# Patient Record
Sex: FEMALE | Race: WHITE | NOT HISPANIC OR LATINO | URBAN - METROPOLITAN AREA
[De-identification: names, ages, dates, MRNs, and addresses within clinical notes are randomized per-mention and may not be internally consistent; named-entity substitution may affect disease eponyms.]

---

## 2019-03-07 PROBLEM — J32.9 CHRONIC SINUSITIS: Status: ACTIVE | Noted: 2019-03-07

## 2019-03-07 PROBLEM — R51.9 SINUS HEADACHE: Status: ACTIVE | Noted: 2019-03-07

## 2019-04-04 PROBLEM — R09.82 POST-NASAL DRIP: Status: ACTIVE | Noted: 2019-04-04

## 2021-02-13 DIAGNOSIS — Z23 ENCOUNTER FOR IMMUNIZATION: ICD-10-CM

## 2022-12-22 PROBLEM — R42 VERTIGO: Status: ACTIVE | Noted: 2022-12-22

## 2022-12-22 PROBLEM — J45.991 COUGH VARIANT ASTHMA: Status: ACTIVE | Noted: 2022-12-22

## 2022-12-22 PROBLEM — H81.10 BENIGN PAROXYSMAL POSITIONAL VERTIGO: Status: ACTIVE | Noted: 2022-12-22

## 2023-06-20 ENCOUNTER — TELEPHONE (OUTPATIENT)
Dept: OBGYN CLINIC | Facility: HOSPITAL | Age: 78
End: 2023-06-20

## 2023-07-26 ENCOUNTER — APPOINTMENT (OUTPATIENT)
Dept: RADIOLOGY | Facility: AMBULARY SURGERY CENTER | Age: 78
End: 2023-07-26
Attending: ORTHOPAEDIC SURGERY
Payer: MEDICARE

## 2023-07-26 ENCOUNTER — OFFICE VISIT (OUTPATIENT)
Dept: OBGYN CLINIC | Facility: CLINIC | Age: 78
End: 2023-07-26
Payer: MEDICARE

## 2023-07-26 DIAGNOSIS — S32.592A CLOSED FRACTURE OF MULTIPLE PUBIC RAMI, LEFT, INITIAL ENCOUNTER (HCC): Primary | ICD-10-CM

## 2023-07-26 DIAGNOSIS — R10.2 PELVIC PAIN: ICD-10-CM

## 2023-07-26 PROCEDURE — 72170 X-RAY EXAM OF PELVIS: CPT

## 2023-07-26 PROCEDURE — 99204 OFFICE O/P NEW MOD 45 MIN: CPT | Performed by: ORTHOPAEDIC SURGERY

## 2023-07-26 RX ORDER — TRAMADOL HYDROCHLORIDE 50 MG/1
50 TABLET ORAL 2 TIMES DAILY PRN
COMMUNITY
Start: 2023-06-14

## 2023-07-26 NOTE — PROGRESS NOTES
Assessment:  1. Closed fracture of multiple pubic rami, left, initial encounter ([address])  XR pelvis ap only 1 or 2 vw        Patient Active Problem List   Diagnosis   • Chronic sinusitis   • Sinus headache   • Post-nasal drip   • Vertigo   • Cough variant asthma           Plan      70 y.o. female with left pubic rami fracture  · X-ray's obtained today, demonstrate interval healing  · Continue use of cane as needed  · Activities as tolerated  · Follow up in 6 weeks, repeat x-ray at that time             Subjective:     Patient ID:    Chief Complaint:Soraya Feliciano [facility] 70 y.o. female      HPI    Patient presents to the office for initial evaluation of pelvic fracture. Patient had repeat falls approximately 6 weeks ago. 6/7/2023 and 6/14/2023, she [place] at the time so seen at an Urgent Care out of network. Patient states she walked with a walker for 3 weeks, she is now using a cane. She notes significant improvements in her pain since time of injury.          The following portions of the patient's history were reviewed and updated as appropriate: allergies, current medications, past family history, past social history, past surgical history and problem list.        Social History     Socioeconomic History   • Marital status: /Civil Union     Spouse name: Anastasiya Lewis    • Number of children: 2   • Years of education: Not on file   • Highest education level: Not on file   Occupational History   • Occupation: Self storage      Comment: full-time    • Occupation: tent/party rental      Comment: full-time    Tobacco Use   • Smoking status: Never   • Smokeless tobacco: Never   Substance and Sexual Activity   • Alcohol use: Yes     Comment: social   • Drug use: Never   • Sexual activity: Not on file   Other Topics Concern   • Not on file   Social History Narrative    Patient lives in a home that was built [address] pump X2 gas/electric     [address] aquilino in the bedroom     [facility] No air  or purifiers     Dehumidifier in the basement     No humidifier     Home is smoke free     [place]     Partially finished basement         Cat (Lizzeth Phelps) and he is allowed in the bedroom         Caffeine: 3 cups of coffee daily                     Ice tea in the summer months                     Hot tea 1x a month     Chocolate consumed 3-4 times a week         Patient lives with spouse      Social Determinants of Health     Financial Resource Strain: Not on file   Food Insecurity: Not on file   Transportation Needs: Not on file   Physical Activity: Sufficiently Active (11/25/2019)    Exercise Vital Sign    • Days of Exercise per Week: 5 days    • Minutes of Exercise per Session: 30 min   Stress: Not on file   Social Connections: Not on file   Intimate Partner Violence: Not on file   Housing Stability: Not on file     Past Medical History:   Diagnosis Date   • Eustachian tube dysfunction    • Hypercholesteremia    • Sinusitis, chronic    • Wears glasses      Past Surgical History:   Procedure Laterality Date   • APPENDECTOMY     • COLONOSCOPY     • EYE SURGERY Left     correct cross eye    • OVARIAN CYST SURGERY     • TONSILECTOMY AND ADNOIDECTOMY     • WISDOM TOOTH EXTRACTION      x2      Allergies   Allergen Reactions   • Codeine Other (See Comments)     Patient states "It takes away my brain use and I get space-like, I don't like it"     Current Outpatient Medications on File Prior to Visit   Medication Sig Dispense Refill   • Multiple Vitamins-Minerals (PRESERVISION AREDS 2+MULTI VIT PO)      • albuterol (Ventolin HFA) 90 mcg/act inhaler Inhale 2 puffs every 6 (six) hours as needed for wheezing 18 g 5   • atorvastatin (LIPITOR) 10 mg tablet Take 10 mg by mouth every evening  1   • Cholecalciferol (D3-1000) 1000 units capsule Take 1,000 Units by mouth     • fexofenadine (ALLEGRA) 180 MG tablet Once Daily in morning     • fluticasone (FLONASE) 50 mcg/act nasal spray 1 spray into each nostril daily for 365 doses 1 Bottle 12   • fluticasone-salmeterol (ADVAIR DISKUS) 250-50 mcg/dose inhaler TAKE 1 PUFF BY MOUTH TWICE A DAY     • glucosamine 500 MG CAPS capsule Once Daily     • Lecithin 1000 MG CHEW Chew     • LUTEIN PO sig 1 po twice daily     • MAGNESIUM PO Take by mouth     • traMADol (ULTRAM) 50 mg tablet Take 50 mg by mouth 2 (two) times a day as needed       No current facility-administered medications on file prior to visit. Objective:    Review of Systems   Constitutional: Negative for chills and fever. HENT: Negative for ear pain and sore throat. Eyes: Negative for pain and visual disturbance. Respiratory: Negative for cough and shortness of breath. Cardiovascular: Negative for chest pain and palpitations. Gastrointestinal: Negative for abdominal pain and vomiting. Genitourinary: Negative for dysuria and hematuria. Musculoskeletal: Negative for arthralgias and back pain. Skin: Negative for color change and rash. Neurological: Negative for seizures and syncope. All other systems reviewed and are negative. Left Hip Exam     Muscle Strength   Abduction: 5/5   Adduction: 5/5   Flexion: 5/5     Other   Erythema: absent  Sensation: normal  Pulse: present    Comments:  Pain with abduction and hip flexion             Physical Exam  Vitals and nursing note reviewed. HENT:      Head: Normocephalic. Eyes:      Extraocular Movements: Extraocular movements intact. Cardiovascular:      Rate and Rhythm: Normal rate. Pulses: Normal pulses. Pulmonary:      Effort: Pulmonary effort is normal.   Musculoskeletal:         General: Normal range of motion. Cervical back: Normal range of motion. Skin:     General: Skin is warm and dry. Neurological:      General: No focal deficit present. Mental Status: She is alert.    Psychiatric:         Behavior: Behavior normal.          Procedures  No Procedures performed today    I have personally reviewed pertinent films in PACS. AP pelvis x-ray obtained today demonstrates superior and inferior left pelvic rami fractures with interval healing      Scribe Attestation    I,:  David Charles am acting as a scribe while in the presence of the attending physician.:       I,:  Jin Zarate, DO personally performed the services described in this documentation    as scribed in my presence.:           Portions of the record may have been created with voice recognition software.  Occasional wrong word or "sound a like" substitutions may have occurred due to the inherent limitations of voice recognition software.  Read the chart carefully and recognize, using context, where substitutions have occurred. same name as above

## 2023-09-07 ENCOUNTER — APPOINTMENT (OUTPATIENT)
Dept: RADIOLOGY | Facility: AMBULARY SURGERY CENTER | Age: 78
End: 2023-09-07
Attending: ORTHOPAEDIC SURGERY
Payer: MEDICARE

## 2023-09-07 ENCOUNTER — OFFICE VISIT (OUTPATIENT)
Dept: OBGYN CLINIC | Facility: CLINIC | Age: 78
End: 2023-09-07
Payer: MEDICARE

## 2023-09-07 VITALS — BODY MASS INDEX: 32.25 KG/M2 | HEIGHT: 59 IN | WEIGHT: 160 LBS

## 2023-09-07 DIAGNOSIS — S32.592A CLOSED FRACTURE OF MULTIPLE PUBIC RAMI, LEFT, INITIAL ENCOUNTER (HCC): ICD-10-CM

## 2023-09-07 DIAGNOSIS — S32.592D CLOSED FRACTURE OF MULTIPLE RAMI OF LEFT PUBIS WITH ROUTINE HEALING, SUBSEQUENT ENCOUNTER: Primary | ICD-10-CM

## 2023-09-07 PROCEDURE — 99212 OFFICE O/P EST SF 10 MIN: CPT | Performed by: ORTHOPAEDIC SURGERY

## 2023-09-07 PROCEDURE — 72170 X-RAY EXAM OF PELVIS: CPT

## 2023-09-07 NOTE — PROGRESS NOTES
Assessment:  1. Closed fracture of multiple rami of left pubis with routine healing, subsequent encounter  XR pelvis ap only 1 or 2 vw        Patient Active Problem List   Diagnosis   • Chronic sinusitis   • Sinus headache   • Post-nasal drip   • Vertigo   • Cough variant asthma           Plan      70 y.o. female with pubic rami fracture, DOI 6/7/2023  · Repeat x-ray's obtained today demonstrate interval healing  · Patient completed DXA scan since last visit and follow up with endocrinology for continued osteoporosis management   · Activities as tolerated  · Follow up in 9 months at which point she will be 1 year s/p fall            Subjective:     Patient ID:    Chief Complaint:Soraya Cazares 70 y.o. female      HPI    Patient presents for follow up evaluation of pelvic fracture. Patient experienced 2 falls 6/7/2023 and 6/14/2023 resulting in left pubic rami fracture. Patient states she is doing well and pain is controlled. She is no longer using a cane for ambulation assistance.      The following portions of the patient's history were reviewed and updated as appropriate: allergies, current medications, past family history, past social history, past surgical history and problem list.        Social History     Socioeconomic History   • Marital status: /Civil Union     Spouse name: Teresa Huang    • Number of children: 2   • Years of education: Not on file   • Highest education level: Not on file   Occupational History   • Occupation: Self storage      Comment: full-time    • Occupation: tent/party rental      Comment: full-time    Tobacco Use   • Smoking status: Never   • Smokeless tobacco: Never   Substance and Sexual Activity   • Alcohol use: Yes     Comment: social   • Drug use: Never   • Sexual activity: Not on file   Other Topics Concern   • Not on file   Social History Narrative    Patient lives in a home that was built [address] X2 gas/electric     [address] aquilino in the bedroom Central air     No air  or purifiers     Dehumidifier in the basement     No humidifier     Home is smoke free     [place]     Partially finished basement         Cat (Nikole Manjarrez) and he is allowed in the bedroom         Caffeine: 3 cups of coffee daily                     Ice tea in the summer months                     Hot tea 1x a month     Chocolate consumed 3-4 times a week         Patient lives with spouse      Social Determinants of Health     Financial Resource Strain: Not on file   Food Insecurity: Not on file   Transportation Needs: Not on file   Physical Activity: Sufficiently Active (11/25/2019)    Exercise Vital Sign    • Days of Exercise per Week: 5 days    • Minutes of Exercise per Session: 30 min   Stress: Not on file   Social Connections: Not on file   Intimate Partner Violence: Not on file   Housing Stability: Not on file     Past Medical History:   Diagnosis Date   • Eustachian tube dysfunction    • Hypercholesteremia    • Sinusitis, chronic    • Wears glasses      Past Surgical History:   Procedure Laterality Date   • APPENDECTOMY     • COLONOSCOPY     • EYE SURGERY Left     correct cross eye    • OVARIAN CYST SURGERY     • TONSILECTOMY AND ADNOIDECTOMY     • WISDOM TOOTH EXTRACTION      x2      Allergies   Allergen Reactions   • Codeine Other (See Comments)     Patient states "It takes away my brain use and I get space-like, I don't like it"     Current Outpatient Medications on File Prior to Visit   Medication Sig Dispense Refill   • albuterol (Ventolin HFA) 90 mcg/act inhaler Inhale 2 puffs every 6 (six) hours as needed for wheezing 18 g 5   • atorvastatin (LIPITOR) 10 mg tablet Take 10 mg by mouth every evening  1   • Cholecalciferol (D3-1000) 1000 units capsule Take 1,000 Units by mouth     • fexofenadine (ALLEGRA) 180 MG tablet Once Daily in morning     • fluticasone (FLONASE) 50 mcg/act nasal spray 1 spray into each nostril daily for 365 doses 1 Bottle 12 • fluticasone-salmeterol (ADVAIR DISKUS) 250-50 mcg/dose inhaler TAKE 1 PUFF BY MOUTH TWICE A DAY     • glucosamine 500 MG CAPS capsule Once Daily     • Lecithin 1000 MG CHEW Chew     • LUTEIN PO sig 1 po twice daily     • MAGNESIUM PO Take by mouth     • Multiple Vitamins-Minerals (PRESERVISION AREDS 2+MULTI VIT PO)      • traMADol (ULTRAM) 50 mg tablet Take 50 mg by mouth 2 (two) times a day as needed       No current facility-administered medications on file prior to visit. Objective:    Review of Systems   Constitutional: Negative for chills and fever. HENT: Negative for ear pain and sore throat. Eyes: Negative for pain and visual disturbance. Respiratory: Negative for cough and shortness of breath. Cardiovascular: Negative for chest pain and palpitations. Gastrointestinal: Negative for abdominal pain and vomiting. Genitourinary: Negative for dysuria and hematuria. Musculoskeletal: Negative for arthralgias and back pain. Skin: Negative for color change and rash. Neurological: Negative for seizures and syncope. All other systems reviewed and are negative. Right Hip Exam     Tenderness   The patient is experiencing no tenderness. Muscle Strength   Abduction: 5/5   Adduction: 5/5   Flexion: 5/5       Left Hip Exam     Tenderness   The patient is experiencing no tenderness. Muscle Strength   Abduction: 5/5   Adduction: 5/5   Flexion: 5/5             Physical Exam  Vitals and nursing note reviewed. HENT:      Head: Normocephalic. Eyes:      Extraocular Movements: Extraocular movements intact. Cardiovascular:      Rate and Rhythm: Normal rate. Pulses: Normal pulses. Pulmonary:      Effort: Pulmonary effort is normal.   Musculoskeletal:         General: Normal range of motion. Cervical back: Normal range of motion. Skin:     General: Skin is warm and dry. Neurological:      General: No focal deficit present. Mental Status: She is alert. Psychiatric:         Behavior: Behavior normal.         Procedures  No Procedures performed today    I have personally reviewed pertinent films in PACS. Scribe Attestation    I,:  Jaren Mckeon am acting as a scribe while in the presence of the attending physician.:       I,:  Dora Michelle, DO personally performed the services described in this documentation    as scribed in my presence.:           Portions of the record may have been created with voice recognition software.  Occasional wrong word or "sound a like" substitutions may have occurred due to the inherent limitations of voice recognition software.  Read the chart carefully and recognize, using context, where substitutions have occurred.

## 2024-06-06 ENCOUNTER — APPOINTMENT (OUTPATIENT)
Dept: RADIOLOGY | Facility: AMBULARY SURGERY CENTER | Age: 79
End: 2024-06-06
Attending: ORTHOPAEDIC SURGERY
Payer: MEDICARE

## 2024-06-06 ENCOUNTER — OFFICE VISIT (OUTPATIENT)
Dept: OBGYN CLINIC | Facility: CLINIC | Age: 79
End: 2024-06-06
Payer: MEDICARE

## 2024-06-06 VITALS — HEIGHT: 59 IN | WEIGHT: 160 LBS | BODY MASS INDEX: 32.25 KG/M2

## 2024-06-06 DIAGNOSIS — S32.592D CLOSED FRACTURE OF MULTIPLE RAMI OF LEFT PUBIS WITH ROUTINE HEALING, SUBSEQUENT ENCOUNTER: Primary | ICD-10-CM

## 2024-06-06 DIAGNOSIS — S32.592D CLOSED FRACTURE OF MULTIPLE RAMI OF LEFT PUBIS WITH ROUTINE HEALING, SUBSEQUENT ENCOUNTER: ICD-10-CM

## 2024-06-06 PROCEDURE — 72170 X-RAY EXAM OF PELVIS: CPT

## 2024-06-06 PROCEDURE — 99213 OFFICE O/P EST LOW 20 MIN: CPT | Performed by: ORTHOPAEDIC SURGERY

## 2024-06-06 RX ORDER — DENOSUMAB 60 MG/ML
INJECTION SUBCUTANEOUS
COMMUNITY
Start: 2023-12-05

## 2024-06-06 NOTE — PROGRESS NOTES
Assessment:  1. Closed fracture of multiple rami of left pubis with routine healing, subsequent encounter  XR pelvis ap only 1 or 2 vw        Patient Active Problem List   Diagnosis    Chronic sinusitis    Sinus headache    Post-nasal drip    Vertigo    Cough variant asthma       Plan:    79 y.o. female  with left superior and inferior pubic rami fractures, DOI 06/06/2023     Discussed x-ray findings of the AP pelvis  Discussed x-ray shows healing of  left superior and inferior pubic rami fractures.  Patient to continue following with endocrinology for management of osteoporosis.  Patient notes she is currently managed on medications for management of osteoporosis.  Patient to follow-up as needed.     The assessment and plan were formulated by Dr. Lechuga and I assisted in carrying it out.    Subjective:   Patient ID: Soraya Cazares is a 79 y.o. female .    HPI    Patient presents to the office for follow up of left pubic rami fracture date of injury 06/06/2023. Patient states she is doing well.  Patient notes some pain in the back and lateral thigh after standing for long periods of time.  Overall doing well.  States she is currently managed on medications for osteoporosis.    The following portions of the patient's history were reviewed and updated as appropriate: allergies, current medications, past family history, past social history, past surgical history and problem list.    Social History     Socioeconomic History    Marital status: /Civil Union     Spouse name: Gunnar     Number of children: 2    Years of education: Not on file    Highest education level: Not on file   Occupational History    Occupation: Self storage      Comment: full-time     Occupation: tent/party rental      Comment: full-time    Tobacco Use    Smoking status: Never    Smokeless tobacco: Never   Substance and Sexual Activity    Alcohol use: Yes     Comment: social    Drug use: Never    Sexual activity: Not on file   Other  Topics Concern    Not on file   Social History Narrative    Patient lives in a home that was built 1964-rebuilt 1996     Heat pump X2 gas/electric     [place]arpet aquilino in the bedroom     Central air     No air  or purifiers     Dehumidifier in the basement     No humidifier     Home is smoke free     [place]     Partially finished basement         Cat (Glo) and he is allowed in the bedroom         Caffeine: 3 cups of coffee daily                     Ice tea in the summer months                     Hot tea 1x a month     Chocolate consumed 3-4 times a week         Patient lives with spouse      Social Determinants of Health     Financial Resource Strain: Low Risk  (5/1/2023)    Received from [facility]    Overall Financial Resource Strain (CARDIA)     Difficulty of Paying Living Expenses: Not hard at all   Food Insecurity: No Food Insecurity (5/1/2023)    Received from [facility]    Hunger Vital Sign     Worried About Running Out of Food in the Last Year: Never true     Ran Out of Food in the Last Year: Never true   Transportation Needs: No Transportation Needs (5/1/2023)    Received from [facility]    PRAPARE - Transportation     Lack of Transportation (Medical): No     Lack of Transportation (Non-Medical): No   Physical Activity: Sufficiently Active (11/25/2019)    Exercise Vital Sign     Days of Exercise per Week: 5 days     Minutes of Exercise per Session: 30 min   Stress: No Stress Concern Present (5/1/2023)    Received from [facility]    [place] [employer] - Occupational Stress Questionnaire     Feeling of Stress : Only a little   Social Connections: Socially Integrated (5/1/2023)    Received from [facility]    Social Connection and Isolation Panel [NHANES]     Frequency of Communication with Friends and Family: More than three times a week     Frequency of Social  "Gatherings with Friends and Family: Three times a week     Attends [employer] Services: 1 to 4 times per year     Active Member of Clubs or Organizations: Yes     Attends Club or Organization Meetings: 1 to 4 times per year     Marital Status:    Intimate Partner Violence: Not At Risk (5/1/2023)    Received from [facility]    Humiliation, Afraid, Rape, and Kick questionnaire     Fear of Current or Ex-Partner: No     Emotionally Abused: No     Physically Abused: No     Sexually Abused: No   Housing Stability: Not on file     Past Medical History:   Diagnosis Date    Eustachian tube dysfunction     Hypercholesteremia     Sinusitis, chronic     Wears glasses      Past Surgical History:   Procedure Laterality Date    APPENDECTOMY      COLONOSCOPY      EYE SURGERY Left     correct cross eye     OVARIAN CYST SURGERY      TONSILECTOMY AND ADNOIDECTOMY      WISDOM TOOTH EXTRACTION      x2      Allergies   Allergen Reactions    Codeine Other (See Comments)     Patient states \"It takes away my brain use and I get space-like, I don't like it\"     Current Outpatient Medications on File Prior to Visit   Medication Sig Dispense Refill    albuterol (Ventolin HFA) 90 mcg/act inhaler Inhale 2 puffs every 6 (six) hours as needed for wheezing 18 g 5    atorvastatin (LIPITOR) 10 mg tablet Take 10 mg by mouth every evening  1    denosumab (Prolia) 60 mg/mL       fexofenadine (ALLEGRA) 180 MG tablet Once Daily in morning      fluticasone (FLONASE) 50 mcg/act nasal spray 1 spray into each nostril daily for 365 doses 1 Bottle 12    glucosamine 500 MG CAPS capsule Once Daily      Lecithin 1000 MG CHEW Chew      MAGNESIUM PO Take by mouth      Multiple Vitamins-Minerals (PRESERVISION AREDS 2+MULTI VIT PO)       [DISCONTINUED] Cholecalciferol (D3-1000) 1000 units capsule Take 1,000 Units by mouth      [DISCONTINUED] fluticasone-salmeterol (ADVAIR DISKUS) 250-50 mcg/dose inhaler TAKE 1 PUFF BY MOUTH TWICE A DAY      " [DISCONTINUED] LUTEIN PO sig 1 po twice daily      [DISCONTINUED] traMADol (ULTRAM) 50 mg tablet Take 50 mg by mouth 2 (two) times a day as needed       No current facility-administered medications on file prior to visit.       Review of Systems   Constitutional:  Negative for chills and fever.   HENT:  Negative for ear pain and sore throat.    Eyes:  Negative for pain and visual disturbance.   Respiratory:  Negative for cough and shortness of breath.    Cardiovascular:  Negative for chest pain and palpitations.   Gastrointestinal:  Negative for abdominal pain and vomiting.   Genitourinary:  Negative for dysuria and hematuria.   Musculoskeletal:  Negative for arthralgias and back pain.   Skin:  Negative for color change and rash.   Neurological:  Negative for seizures and syncope.   All other systems reviewed and are negative.    See HPi    Objective:    There were no vitals filed for this visit.    Physical Exam  Vitals and nursing note reviewed.   Constitutional:       General: She is not in acute distress.     Appearance: She is well-developed.   HENT:      Head: Normocephalic and atraumatic.   Eyes:      Conjunctiva/sclera: Conjunctivae normal.   Cardiovascular:      Rate and Rhythm: Normal rate and regular rhythm.      Heart sounds: No murmur heard.  Pulmonary:      Effort: Pulmonary effort is normal. No respiratory distress.      Breath sounds: Normal breath sounds.   Abdominal:      Palpations: Abdomen is soft.      Tenderness: There is no abdominal tenderness.   Musculoskeletal:         General: No swelling.      Cervical back: Neck supple.   Skin:     General: Skin is warm and dry.      Capillary Refill: Capillary refill takes less than 2 seconds.   Neurological:      Mental Status: She is alert.   Psychiatric:         Mood and Affect: Mood normal.           I have personally reviewed pertinent films in PACS and my interpretation is x-ray AP pelvis reveals healing fractures of superior and inferior pubic  "rami fractures.    Procedures  No Procedures performed today     Portions of the record may have been created with voice recognition software.  Occasional wrong word or \"sound a like\" substitutions may have occurred due to the inherent limitations of voice recognition software.  Read the chart carefully and recognize, using context, where substitutions have occurred.   "

## 2024-06-26 ENCOUNTER — APPOINTMENT (OUTPATIENT)
Dept: RADIOLOGY | Facility: AMBULARY SURGERY CENTER | Age: 79
End: 2024-06-26
Attending: ORTHOPAEDIC SURGERY
Payer: MEDICARE

## 2024-06-26 ENCOUNTER — OFFICE VISIT (OUTPATIENT)
Dept: OBGYN CLINIC | Facility: CLINIC | Age: 79
End: 2024-06-26
Payer: MEDICARE

## 2024-06-26 VITALS
WEIGHT: 160 LBS | DIASTOLIC BLOOD PRESSURE: 80 MMHG | BODY MASS INDEX: 32.25 KG/M2 | HEIGHT: 59 IN | HEART RATE: 101 BPM | SYSTOLIC BLOOD PRESSURE: 149 MMHG

## 2024-06-26 DIAGNOSIS — S83.241A TEAR OF MEDIAL MENISCUS OF RIGHT KNEE, CURRENT, UNSPECIFIED TEAR TYPE, INITIAL ENCOUNTER: Primary | ICD-10-CM

## 2024-06-26 DIAGNOSIS — M25.561 RIGHT KNEE PAIN, UNSPECIFIED CHRONICITY: ICD-10-CM

## 2024-06-26 DIAGNOSIS — S83.411A SPRAIN OF MEDIAL COLLATERAL LIGAMENT OF RIGHT KNEE, INITIAL ENCOUNTER: ICD-10-CM

## 2024-06-26 PROCEDURE — 20610 DRAIN/INJ JOINT/BURSA W/O US: CPT | Performed by: ORTHOPAEDIC SURGERY

## 2024-06-26 PROCEDURE — 73562 X-RAY EXAM OF KNEE 3: CPT

## 2024-06-26 PROCEDURE — 99214 OFFICE O/P EST MOD 30 MIN: CPT | Performed by: ORTHOPAEDIC SURGERY

## 2024-06-26 RX ORDER — BUPIVACAINE HYDROCHLORIDE 2.5 MG/ML
2 INJECTION, SOLUTION EPIDURAL; INFILTRATION; INTRACAUDAL
Status: COMPLETED | OUTPATIENT
Start: 2024-06-26 | End: 2024-06-26

## 2024-06-26 RX ORDER — ROMOSOZUMAB-AQQG 105 MG/1.17ML
INJECTION, SOLUTION SUBCUTANEOUS
COMMUNITY
Start: 2024-06-18

## 2024-06-26 RX ORDER — TRIAMCINOLONE ACETONIDE 40 MG/ML
40 INJECTION, SUSPENSION INTRA-ARTICULAR; INTRAMUSCULAR
Status: COMPLETED | OUTPATIENT
Start: 2024-06-26 | End: 2024-06-26

## 2024-06-26 RX ADMIN — BUPIVACAINE HYDROCHLORIDE 2 ML: 2.5 INJECTION, SOLUTION EPIDURAL; INFILTRATION; INTRACAUDAL at 13:15

## 2024-06-26 RX ADMIN — TRIAMCINOLONE ACETONIDE 40 MG: 40 INJECTION, SUSPENSION INTRA-ARTICULAR; INTRAMUSCULAR at 13:15

## 2024-06-26 NOTE — PROGRESS NOTES
Assessment:  1. Tear of medial meniscus of right knee, current, unspecified tear type, initial encounter  MRI knee right wo contrast      2. Sprain of medial collateral ligament of right knee, initial encounter  XR knee 3 vw right non injury        Patient Active Problem List   Diagnosis    Chronic sinusitis    Sinus headache    Post-nasal drip    Vertigo    Cough variant asthma    Sprain of medial collateral ligament of right knee    Tear of medial meniscus of right knee, current       Plan:    79 y.o. female with medial meniscus tear and MCL sprain of the right knee     Xray of the right knee reviewed with patient   Underlying pathology of diagnosis reviewed with patient   Discussed CSI of the right knee. Discussed side effects and risks. Patient agreeable. Injection administered.   Discussed MRI of the right knee to rule out medial meniscus tear of the right knee. Discussed if no improvement with CSI, should complete MRI.   Follow up after MRI     The patient was seen and examined by Dr. Lechuga and myself. The assessment and plan were formulated by Dr. Lechuga and I assisted in carrying it out.    Subjective:   Patient ID: Soraya Cazares is a 79 y.o. female .    HPI    Patient comes in today with regards to right knee pain.  Patient is referred to us by Danika Elder MD for further evaluation. Patient notes right knee pain x 1 week. Patient states she was doing a puzzle where she was reaching over the table, when she went to leave she suddenly felt sharp pain to the medial and anterior aspect of the right knee. Notes use of advil and tylenol as well as compression bracing with moderate relief of pain. Notes she is currently ambulating with pain secondary to injury. Denies previous sx or injury. Denies previous CSI.       The following portions of the patient's history were reviewed and updated as appropriate: allergies, current medications, past family history, past social history, past surgical history and  problem list.    Social History     Socioeconomic History    Marital status: /Civil Union     Spouse name: Gunnar     Number of children: 2    Years of education: Not on file    Highest education level: Not on file   Occupational History    Occupation: Self storage      Comment: full-time     Occupation: tent/party rental      Comment: full-time    Tobacco Use    Smoking status: Never    Smokeless tobacco: Never   Substance and Sexual Activity    Alcohol use: Yes     Comment: social    Drug use: Never    Sexual activity: Not on file   Other Topics Concern    Not on file   Social History Narrative    Patient lives in a home that was built 1964-rebuilt 1996     Heat pump X2 gas/electric     Carpet aquilino in the bedroom     Central air     No air  or purifiers     Dehumidifier in the basement     No humidifier     Home is smoke free     [place]     Partially finished basement         Cat (Glo) and he is allowed in the bedroom         Caffeine: 3 cups of coffee daily                     Ice tea in the summer months                     Hot tea 1x a month     Chocolate consumed 3-4 times a week         Patient lives with spouse      Social Determinants of Health     Financial Resource Strain: Low Risk  (5/1/2023)    Received from [facility]    Overall Financial Resource Strain (CARDIA)     Difficulty of Paying Living Expenses: Not hard at all   Food Insecurity: No Food Insecurity (5/1/2023)    Received from [facility]    Hunger Vital Sign     Worried About Running Out of Food in the Last Year: Never true     Ran Out of Food in the Last Year: Never true   Transportation Needs: No Transportation Needs (5/1/2023)    Received from [facility]    PRAPARE - Transportation     Lack of Transportation (Medical): No     Lack of Transportation (Non-Medical): No   Physical Activity: Sufficiently Active (11/25/2019)    Exercise Vital  "Sign     Days of Exercise per Week: 5 days     Minutes of Exercise per Session: 30 min   Stress: No Stress Concern Present (5/1/2023)    Received from [facility]    [place] [employer] - Occupational Stress Questionnaire     Feeling of Stress : Only a little   Social Connections: Socially Integrated (5/1/2023)    Received from [facility]    Social Connection and Isolation Panel [NHANES]     Frequency of Communication with Friends and Family: More than three times a week     Frequency of Social Gatherings with Friends and Family: Three times a week     Attends Orthodoxy Services: 1 to 4 times per year     Active Member of Clubs or Organizations: Yes     Attends Club or Organization Meetings: 1 to 4 times per year     Marital Status:    Intimate Partner Violence: Not At Risk (5/1/2023)    Received from [facility]    Humiliation, Afraid, Rape, and Kick questionnaire     Fear of Current or Ex-Partner: No     Emotionally Abused: No     Physically Abused: No     Sexually Abused: No   Housing Stability: Not on file     Past Medical History:   Diagnosis Date    Eustachian tube dysfunction     Hypercholesteremia     Sinusitis, chronic     Wears glasses      Past Surgical History:   Procedure Laterality Date    APPENDECTOMY      COLONOSCOPY      EYE SURGERY Left     correct cross eye     OVARIAN CYST SURGERY      TONSILECTOMY AND ADNOIDECTOMY      WISDOM TOOTH EXTRACTION      x2      Allergies   Allergen Reactions    Codeine Other (See Comments)     Patient states \"It takes away my brain use and I get space-like, I don't like it\"     Current Outpatient Medications on File Prior to Visit   Medication Sig Dispense Refill    romosozumab-aqqg (Evenity) subcutaneous injection       albuterol (Ventolin HFA) 90 mcg/act inhaler Inhale 2 puffs every 6 (six) hours as needed for wheezing 18 g 5    atorvastatin (LIPITOR) 10 mg tablet Take 10 mg by mouth " every evening  1    denosumab (Prolia) 60 mg/mL       fexofenadine (ALLEGRA) 180 MG tablet Once Daily in morning      fluticasone (FLONASE) 50 mcg/act nasal spray 1 spray into each nostril daily for 365 doses 1 Bottle 12    glucosamine 500 MG CAPS capsule Once Daily      Lecithin 1000 MG CHEW Chew      MAGNESIUM PO Take by mouth      Multiple Vitamins-Minerals (PRESERVISION AREDS 2+MULTI VIT PO)        No current facility-administered medications on file prior to visit.       Review of Systems   Constitutional:  Negative for chills and fever.   HENT:  Negative for ear pain and sore throat.    Eyes:  Negative for pain and visual disturbance.   Respiratory:  Negative for cough and shortness of breath.    Cardiovascular:  Negative for chest pain and palpitations.   Gastrointestinal:  Negative for abdominal pain and vomiting.   Genitourinary:  Negative for dysuria and hematuria.   Musculoskeletal:  Positive for arthralgias. Negative for back pain.   Skin:  Negative for color change and rash.   Neurological:  Negative for seizures and syncope.   All other systems reviewed and are negative.        Objective:    Vitals:    06/26/24 1320   BP: 149/80   Pulse: 101       Physical Exam  Vitals and nursing note reviewed.   Constitutional:       General: She is not in acute distress.     Appearance: She is well-developed.   HENT:      Head: Normocephalic and atraumatic.   Eyes:      Conjunctiva/sclera: Conjunctivae normal.   Cardiovascular:      Rate and Rhythm: Normal rate and regular rhythm.      Heart sounds: No murmur heard.  Pulmonary:      Effort: Pulmonary effort is normal. No respiratory distress.      Breath sounds: Normal breath sounds.   Abdominal:      Palpations: Abdomen is soft.      Tenderness: There is no abdominal tenderness.   Musculoskeletal:         General: No swelling.      Cervical back: Neck supple.      Right knee:      Instability Tests: Medial Lydia test positive.   Skin:     General: Skin is warm  "and dry.      Capillary Refill: Capillary refill takes less than 2 seconds.   Neurological:      Mental Status: She is alert.   Psychiatric:         Mood and Affect: Mood normal.         Right Knee Exam     Tenderness   The patient is experiencing tenderness in the pes anserinus.    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    Large joint arthrocentesis: R knee  Universal Protocol:  Consent given by: patient  Supporting Documentation  Indications: pain   Procedure Details  Location: knee - R knee  Needle size: 22 G  Ultrasound guidance: no  Approach: anterolateral  Medications administered: 2 mL bupivacaine (PF) 0.25 %; 40 mg triamcinolone acetonide 40 mg/mL                  Scribe Attestation      I,:  Johnna Chow PA-C am acting as a scribe while in the presence of the attending physician.:       I,:  Mic Lechuga DO personally performed the services described in this documentation    as scribed in my presence.:               Portions of the record may have been created with voice recognition software.  Occasional wrong word or \"sound a like\" substitutions may have occurred due to the inherent limitations of voice recognition software.  Read the chart carefully and recognize, using context, where substitutions have occurred.   "

## 2024-07-10 ENCOUNTER — HOSPITAL ENCOUNTER (OUTPATIENT)
Dept: MRI IMAGING | Facility: HOSPITAL | Age: 79
Discharge: HOME | End: 2024-07-10
Payer: MEDICARE

## 2024-07-10 DIAGNOSIS — S83.241A TEAR OF MEDIAL MENISCUS OF RIGHT KNEE, CURRENT, UNSPECIFIED TEAR TYPE, INITIAL ENCOUNTER: ICD-10-CM

## 2024-07-10 PROCEDURE — 73721 MRI JNT OF LWR EXTRE W/O DYE: CPT

## 2024-07-12 ENCOUNTER — TELEPHONE (OUTPATIENT)
Dept: OBGYN CLINIC | Facility: CLINIC | Age: 79
End: 2024-07-12

## 2024-07-12 NOTE — TELEPHONE ENCOUNTER
Called and spoke with patient regarding insufficiency fracture of the medial tibial plateau seen on MRI.  Discussed patient should maintain nonweightbearing to the right lower extremity secondary to insufficiency fracture.  Discussed in order to allow for proper healing could take up to 12 weeks.  Encouraged patient to maintain nonweightbearing status and modify activities.  Patient to follow-up with us in 4 weeks from original visit for further discussion.    Johnna Chow PAC

## 2024-07-26 ENCOUNTER — OFFICE VISIT (OUTPATIENT)
Dept: OBGYN CLINIC | Facility: CLINIC | Age: 79
End: 2024-07-26
Payer: MEDICARE

## 2024-07-26 ENCOUNTER — APPOINTMENT (OUTPATIENT)
Dept: RADIOLOGY | Facility: AMBULARY SURGERY CENTER | Age: 79
End: 2024-07-26
Attending: ORTHOPAEDIC SURGERY
Payer: MEDICARE

## 2024-07-26 VITALS
BODY MASS INDEX: 32.25 KG/M2 | HEART RATE: 72 BPM | WEIGHT: 160 LBS | SYSTOLIC BLOOD PRESSURE: 123 MMHG | HEIGHT: 59 IN | DIASTOLIC BLOOD PRESSURE: 80 MMHG

## 2024-07-26 DIAGNOSIS — M84.361D STRESS FRACTURE OF RIGHT TIBIA WITH ROUTINE HEALING, SUBSEQUENT ENCOUNTER: Primary | ICD-10-CM

## 2024-07-26 DIAGNOSIS — S83.411A SPRAIN OF MEDIAL COLLATERAL LIGAMENT OF RIGHT KNEE, INITIAL ENCOUNTER: ICD-10-CM

## 2024-07-26 DIAGNOSIS — S83.241A OTHER TEAR OF MEDIAL MENISCUS OF RIGHT KNEE AS CURRENT INJURY, INITIAL ENCOUNTER: ICD-10-CM

## 2024-07-26 PROCEDURE — 99213 OFFICE O/P EST LOW 20 MIN: CPT | Performed by: ORTHOPAEDIC SURGERY

## 2024-07-26 PROCEDURE — 73562 X-RAY EXAM OF KNEE 3: CPT

## 2024-07-26 NOTE — PROGRESS NOTES
Assessment:  1. Stress fracture of right medial tibial plateau with routine healing, subsequent encounter  XR knee 3 vw right non injury      2. Other tear of medial meniscus of right knee as current injury, initial encounter          Patient Active Problem List   Diagnosis    Chronic sinusitis    Sinus headache    Post-nasal drip    Vertigo    Cough variant asthma    Sprain of medial collateral ligament of right knee    Tear of medial meniscus of right knee, current       Plan:    79 y.o. female  with stress fracture of medial tibial plateau of right knee complicated by medial meniscus tear, DOI 06/13/2024    Discussed MRI of the right knee with patient.   Discussed x ray findings of the right knee with patient   Discussed underlying pathology of diagnosis   Discussed stress reaction in extent. Discussed pathology secondary to underlying Osteoporosis. Patient currently managed on Evenity.   Discussed secondary to Osteoporosis and location of stress fracture at medial tibial plateau may require up to 3 months to allow for full healing. Patient expressed understanding.   Discussed as stress reaction heals could possibly consider partial knee replacement vs Menisectomy. Discussed Menisectomy would guarantee worsening of the arthritis which we believe is the ultimate cause of all her underlying pathologies. Discussed long term TKA vs partial knee replacement is possibly the best course given her medial arthritis. Patient expressed understanding.   Patient to transition to partial weightbearing secondary to signs of healing on x-ray.  Patient expressed understanding.  Patient agreeable.  Follow-up in 4 weeks for repeat x-rays of the right knee      The patient was seen and examined by Dr. Lechuga and myself. The assessment and plan were formulated by Dr. Lechuga and I assisted in carrying it out.    Subjective:   Patient ID: Soraya Cazares is a 79 y.o. female .    HPI    Patient presents to the office for follow up of  right knee pain. Patient has been NWB to the right lower extremity with use of walker since phone call with us 07/12/2024. Notes medial knee pain with intial first step after rest. Patient presents today for further discussion of MRI right knee     The following portions of the patient's history were reviewed and updated as appropriate: allergies, current medications, past family history, past social history, past surgical history and problem list.    Social History     Socioeconomic History    Marital status: /Civil Union     Spouse name: Gunnar     Number of children: 2    Years of education: Not on file    Highest education level: Not on file   Occupational History    Occupation: Self storage      Comment: full-time     Occupation: tent/party rental      Comment: full-time    Tobacco Use    Smoking status: Never    Smokeless tobacco: Never   Substance and Sexual Activity    Alcohol use: Yes     Comment: social    Drug use: Never    Sexual activity: Not on file   Other Topics Concern    Not on file   Social History Narrative    Patient lives in a home that was built 1964-rebuilt 1996     Heat pump X2 gas/electric     Carpet aquilino in the bedroom     Central air     No air  or purifiers     Dehumidifier in the basement     No humidifier     Home is smoke free     [place]     Partially finished basement         Cat (Kipper) and he is allowed in the bedroom         Caffeine: 3 cups of coffee daily                     Ice tea in the summer months                     Hot tea 1x a month     Chocolate consumed 3-4 times a week         Patient lives with spouse      Social Determinants of Health     Financial Resource Strain: Low Risk  (5/1/2023)    [facility]    Overall Financial Resource Strain (CARDIA)     Difficulty of Paying Living Expenses: Not hard at all   Food Insecurity: No Food Insecurity (5/1/2023)    [facility]    Hunger Vital Sign     Worried About Running Out of Food in the Last Year: Never true     Ran Out of Food in the Last Year: Never true   Transportation Needs: No Transportation Needs (5/1/2023)    Received from [facility]    PRAPARE - Transportation     Lack of Transportation (Medical): No     Lack of Transportation (Non-Medical): No   Physical Activity: Sufficiently Active (11/25/2019)    Exercise Vital Sign     Days of Exercise per Week: 5 days     Minutes of Exercise per Session: 30 min   Stress: No Stress Concern Present (5/1/2023)    Received from [facility]    [place] [employer] Occupational Health - Occupational Stress Questionnaire     Feeling of Stress : Only a little   Social Connections: Socially Integrated (5/1/2023)    Received from [facility]    Social Connection and Isolation Panel [NHANES]     Frequency of Communication with Friends and Family: More than three times a week     Frequency of Social Gatherings with Friends and Family: Three times a week     Attends Adventism Services: 1 to 4 times per year     Active Member of Clubs or Organizations: Yes     Attends Club or Organization Meetings: 1 to 4 times per year     Marital Status:    Intimate Partner Violence: Not At Risk (5/1/2023)    Received from [facility]    Humiliation, Afraid, Rape, and Kick questionnaire     Fear of Current or Ex-Partner: No     Emotionally Abused: No     Physically Abused: No     Sexually Abused: No   Housing Stability: Not on file     Past Medical History:   Diagnosis Date    Eustachian tube dysfunction     Hypercholesteremia     Sinusitis, chronic     Wears glasses      Past Surgical History:   Procedure Laterality Date    APPENDECTOMY      COLONOSCOPY      EYE SURGERY Left     correct cross eye     OVARIAN CYST SURGERY      TONSILECTOMY AND ADNOIDECTOMY      WISDOM TOOTH EXTRACTION      x2      Allergies   Allergen  "Reactions    Codeine Other (See Comments)     Patient states \"It takes away my brain use and I get space-like, I don't like it\"     Current Outpatient Medications on File Prior to Visit   Medication Sig Dispense Refill    albuterol (Ventolin HFA) 90 mcg/act inhaler Inhale 2 puffs every 6 (six) hours as needed for wheezing 18 g 5    atorvastatin (LIPITOR) 10 mg tablet Take 10 mg by mouth every evening  1    denosumab (Prolia) 60 mg/mL       fexofenadine (ALLEGRA) 180 MG tablet Once Daily in morning      fluticasone (FLONASE) 50 mcg/act nasal spray 1 spray into each nostril daily for 365 doses 1 Bottle 12    glucosamine 500 MG CAPS capsule Once Daily      Lecithin 1000 MG CHEW Chew      MAGNESIUM PO Take by mouth      Multiple Vitamins-Minerals (PRESERVISION AREDS 2+MULTI VIT PO)       romosozumab-aqqg (Evenity) subcutaneous injection        No current facility-administered medications on file prior to visit.       Review of Systems   Constitutional:  Negative for chills and fever.   HENT:  Negative for ear pain and sore throat.    Eyes:  Negative for pain and visual disturbance.   Respiratory:  Negative for cough and shortness of breath.    Cardiovascular:  Negative for chest pain and palpitations.   Gastrointestinal:  Negative for abdominal pain and vomiting.   Genitourinary:  Negative for dysuria and hematuria.   Musculoskeletal:  Positive for arthralgias. Negative for back pain.   Skin:  Negative for color change and rash.   Neurological:  Negative for seizures and syncope.   All other systems reviewed and are negative.    See HPi    Objective:    Vitals:    07/26/24 0758   BP: 123/80   Pulse: 72       Physical Exam  Vitals and nursing note reviewed.   Constitutional:       General: She is not in acute distress.     Appearance: She is well-developed.   HENT:      Head: Normocephalic and atraumatic.   Eyes:      Conjunctiva/sclera: Conjunctivae normal.   Cardiovascular:      Rate and Rhythm: Normal rate and regular " "rhythm.      Heart sounds: No murmur heard.  Pulmonary:      Effort: Pulmonary effort is normal. No respiratory distress.      Breath sounds: Normal breath sounds.   Abdominal:      Palpations: Abdomen is soft.      Tenderness: There is no abdominal tenderness.   Musculoskeletal:         General: No swelling.      Cervical back: Neck supple.      Right knee: No effusion.   Skin:     General: Skin is warm and dry.      Capillary Refill: Capillary refill takes less than 2 seconds.   Neurological:      Mental Status: She is alert.   Psychiatric:         Mood and Affect: Mood normal.         Right Knee Exam     Range of Motion   Extension:  normal   Flexion:  normal     Other   Erythema: absent  Scars: absent  Effusion: no effusion present            I have personally reviewed pertinent films in PACS and my interpretation is MRI right knee reveals stress fracture of the medial tibial plateau, moderate medial tibiofemoral osteoarthritis, and medial meniscus tear at the posterior horn.  Xray right knee reveals signs of healing of medial tibial plateau stress fracture.    Procedures  No Procedures performed today         Scribe Attestation      I,:  Johnna Chow PA-C am acting as a scribe while in the presence of the attending physician.:       I,:  Mic Lechuga DO personally performed the services described in this documentation    as scribed in my presence.:                Portions of the record may have been created with voice recognition software.  Occasional wrong word or \"sound a like\" substitutions may have occurred due to the inherent limitations of voice recognition software.  Read the chart carefully and recognize, using context, where substitutions have occurred.   "

## 2024-08-23 ENCOUNTER — APPOINTMENT (OUTPATIENT)
Dept: RADIOLOGY | Facility: AMBULARY SURGERY CENTER | Age: 79
End: 2024-08-23
Attending: ORTHOPAEDIC SURGERY
Payer: MEDICARE

## 2024-08-23 ENCOUNTER — OFFICE VISIT (OUTPATIENT)
Dept: OBGYN CLINIC | Facility: CLINIC | Age: 79
End: 2024-08-23
Payer: MEDICARE

## 2024-08-23 VITALS
DIASTOLIC BLOOD PRESSURE: 82 MMHG | SYSTOLIC BLOOD PRESSURE: 137 MMHG | BODY MASS INDEX: 32.25 KG/M2 | HEART RATE: 93 BPM | WEIGHT: 160 LBS | HEIGHT: 59 IN

## 2024-08-23 DIAGNOSIS — M84.361D STRESS FRACTURE OF RIGHT TIBIA WITH ROUTINE HEALING, SUBSEQUENT ENCOUNTER: Primary | ICD-10-CM

## 2024-08-23 DIAGNOSIS — M25.561 RIGHT KNEE PAIN, UNSPECIFIED CHRONICITY: ICD-10-CM

## 2024-08-23 DIAGNOSIS — S83.241A TEAR OF MEDIAL MENISCUS OF RIGHT KNEE, CURRENT, UNSPECIFIED TEAR TYPE, INITIAL ENCOUNTER: ICD-10-CM

## 2024-08-23 DIAGNOSIS — M17.11 PRIMARY OSTEOARTHRITIS OF RIGHT KNEE: ICD-10-CM

## 2024-08-23 PROBLEM — M84.361A STRESS FRACTURE OF RIGHT TIBIA: Status: ACTIVE | Noted: 2024-08-23

## 2024-08-23 PROCEDURE — 73562 X-RAY EXAM OF KNEE 3: CPT

## 2024-08-23 PROCEDURE — 99213 OFFICE O/P EST LOW 20 MIN: CPT | Performed by: ORTHOPAEDIC SURGERY

## 2024-08-23 NOTE — PROGRESS NOTES
Assessment:  1. Stress fracture of right medial tibial plateau with routine healing, subsequent encounter        2. Primary osteoarthritis of right knee  XR knee 3 vw right non injury      3. Tear of medial meniscus of right knee, current, unspecified tear type, initial encounter          Patient Active Problem List   Diagnosis    Chronic sinusitis    Sinus headache    Post-nasal drip    Vertigo    Cough variant asthma    Sprain of medial collateral ligament of right knee    Tear of medial meniscus of right knee, current    Right knee pain    Stress fracture of right tibia       Plan:    79 y.o. female  with stress fracture of medial tibial plateau, medial meniscus tear, and osteoarthritis of the right knee    Discussed x-ray findings of the right knee  Discussed patient may transition to full weightbearing.  Patient expressed understanding.  Patient agreeable.  Discussed medial  brace of the right knee to be worn at times of increased activity.  Patient expressed understanding.  Patient agreeable.  Order placed today.  Discussed if sharp persistent pain in the medial aspect of the knee can consider meniscectomy to provide relief.  Patient deferred further surgical discussion at this time as she is not experiencing significant pain.  Patient to follow-up as needed    The assessment and plan were formulated by Dr. Lechuga and I assisted in carrying it out.      Subjective:   Patient ID: Soraya Cazares is a 79 y.o. female .    HPI    Patient presents to the office for follow up of right knee pain.  Patient states she is doing well.  Denies pain.  Patient has been full weightbearing on the right lower extremity.    The following portions of the patient's history were reviewed and updated as appropriate: allergies, current medications, past family history, past social history, past surgical history and problem list.    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      Comment: full-time     Occupation: tent/party rental      Comment: full-time    Tobacco Use    Smoking status: Never    Smokeless tobacco: Never   Substance and Sexual Activity    Alcohol use: Yes     Comment: social    Drug use: Never    Sexual activity: Not on file   Other Topics Concern    Not on file   Social History Narrative    Patient lives in a home that was built 1964-rebuilt 1996     Heat pump X2 gas/electric     Carpet aquilino in the bedroom     Central air     No air  or purifiers     Dehumidifier in the basement     No humidifier     Home is smoke free     [place]     Partially finished basement         Cat (Glo) and he is allowed in the bedroom         Caffeine: 3 cups of coffee daily                     Ice tea in the summer months                     Hot tea 1x a month     Chocolate consumed 3-4 times a week         Patient lives with spouse      Social Determinants of Health     Financial Resource Strain: Low Risk  (5/1/2023)    Received from [facility]    Overall Financial Resource Strain (CARDIA)     Difficulty of Paying Living Expenses: Not hard at all   Food Insecurity: No Food Insecurity (5/1/2023)    Received from [facility]    Hunger Vital Sign     Worried About Running Out of Food in the Last Year: Never true     Ran Out of Food in the Last Year: Never true   Transportation Needs: No Transportation Needs (5/1/2023)    Received from [facility]    PRAPARE - Transportation     Lack of Transportation (Medical): No     Lack of Transportation (Non-Medical): No   Physical Activity: Sufficiently Active (11/25/2019)    Exercise Vital Sign     Days of Exercise per Week: 5 days     Minutes of Exercise per Session: 30 min   Stress: No Stress  "Concern Present (5/1/2023)    Received from [facility]    [employer] [place] [employer] - Occupational Stress Questionnaire     Feeling of Stress : Only a little   Social Connections: Socially Integrated (5/1/2023)    Received from [facility]    Social Connection and Isolation Panel [NHANES]     Frequency of Communication with Friends and Family: More than three times a week     Frequency of Social Gatherings with Friends and Family: Three times a week     Attends L[employer] Services: 1 to 4 times per year     Active Member of Clubs or Organizations: Yes     Attends Club or Organization Meetings: 1 to 4 times per year     Marital Status:    Intimate Partner Violence: Not At Risk (5/1/2023)    Received from [facility]    Humiliation, Afraid, Rape, and Kick questionnaire     Fear of Current or Ex-Partner: No     Emotionally Abused: No     Physically Abused: No     Sexually Abused: No   Housing Stability: Not on file     Past Medical History:   Diagnosis Date    Eustachian tube dysfunction     Hypercholesteremia     Sinusitis, chronic     Wears glasses      Past Surgical History:   Procedure Laterality Date    APPENDECTOMY      COLONOSCOPY      EYE SURGERY Left     correct cross eye     OVARIAN CYST SURGERY      TONSILECTOMY AND ADNOIDECTOMY      WISDOM TOOTH EXTRACTION      x2      Allergies   Allergen Reactions    Codeine Other (See Comments)     Patient states \"It takes away my brain use and I get space-like, I don't like it\"     Current Outpatient Medications on File Prior to Visit   Medication Sig Dispense Refill    albuterol (Ventolin HFA) 90 mcg/act inhaler Inhale 2 puffs every 6 (six) hours as needed for wheezing 18 g 5    atorvastatin (LIPITOR) 10 mg tablet Take 10 mg by mouth every evening  1    fexofenadine (ALLEGRA) 180 MG tablet Once Daily in morning      glucosamine 500 MG CAPS capsule Once Daily      Lecithin 1000 MG CHEW Chew      " MAGNESIUM PO Take by mouth      Multiple Vitamins-Minerals (PRESERVISION AREDS 2+MULTI VIT PO)       romosozumab-aqqg (Evenity) subcutaneous injection       denosumab (Prolia) 60 mg/mL       fluticasone (FLONASE) 50 mcg/act nasal spray 1 spray into each nostril daily for 365 doses 1 Bottle 12     No current facility-administered medications on file prior to visit.       Review of Systems   Constitutional:  Negative for chills and fever.   HENT:  Negative for ear pain and sore throat.    Eyes:  Negative for pain and visual disturbance.   Respiratory:  Negative for cough and shortness of breath.    Cardiovascular:  Negative for chest pain and palpitations.   Gastrointestinal:  Negative for abdominal pain and vomiting.   Genitourinary:  Negative for dysuria and hematuria.   Musculoskeletal:  Positive for arthralgias. Negative for back pain.   Skin:  Negative for color change and rash.   Neurological:  Negative for seizures and syncope.   All other systems reviewed and are negative.    See HPi    Objective:    Vitals:    08/23/24 0810   BP: 137/82   Pulse: 93       Physical Exam  Vitals and nursing note reviewed.   Constitutional:       General: She is not in acute distress.     Appearance: She is well-developed.   HENT:      Head: Normocephalic and atraumatic.   Eyes:      Conjunctiva/sclera: Conjunctivae normal.   Cardiovascular:      Rate and Rhythm: Normal rate and regular rhythm.      Heart sounds: No murmur heard.  Pulmonary:      Effort: Pulmonary effort is normal. No respiratory distress.      Breath sounds: Normal breath sounds.   Abdominal:      Palpations: Abdomen is soft.      Tenderness: There is no abdominal tenderness.   Musculoskeletal:         General: No swelling.      Cervical back: Neck supple.      Right knee: No effusion.   Skin:     General: Skin is warm and dry.      Capillary Refill: Capillary refill takes less than 2 seconds.   Neurological:      Mental Status: She is alert.   Psychiatric:     "     Mood and Affect: Mood normal.         Right Knee Exam     Tenderness   The patient is experiencing tenderness in the medial joint line.    Range of Motion   Extension:  normal   Flexion:  normal     Other   Erythema: absent  Scars: absent  Effusion: no effusion present    Comments:  Mild tenderness to palpation over the medial tibial plateau            I have personally reviewed pertinent films in PACS and my interpretation is x-ray right knee reveals signs of healing of fracture of the medial tibial plateau.    Procedures  No Procedures performed today       Portions of the record may have been created with voice recognition software.  Occasional wrong word or \"sound a like\" substitutions may have occurred due to the inherent limitations of voice recognition software.  Read the chart carefully and recognize, using context, where substitutions have occurred.   "